# Patient Record
Sex: MALE | Race: WHITE | NOT HISPANIC OR LATINO
[De-identification: names, ages, dates, MRNs, and addresses within clinical notes are randomized per-mention and may not be internally consistent; named-entity substitution may affect disease eponyms.]

---

## 2019-01-02 ENCOUNTER — TELEPHONE (OUTPATIENT)
Dept: SCHEDULING | Age: 54
End: 2019-01-02

## 2019-01-09 ENCOUNTER — WALK IN (OUTPATIENT)
Dept: URGENT CARE | Age: 54
End: 2019-01-09

## 2019-01-09 VITALS
DIASTOLIC BLOOD PRESSURE: 76 MMHG | SYSTOLIC BLOOD PRESSURE: 130 MMHG | WEIGHT: 198 LBS | RESPIRATION RATE: 16 BRPM | TEMPERATURE: 97.8 F | BODY MASS INDEX: 26.82 KG/M2 | HEART RATE: 76 BPM | HEIGHT: 72 IN

## 2019-01-09 DIAGNOSIS — R94.7 NONE TO LOW SERUM CORTISOL RESPONSE WITH ADRENOCORTICOTROPHIC HORMONE (ACTH) STIMULATION TEST: Primary | ICD-10-CM

## 2019-01-09 DIAGNOSIS — Z11.1 SCREENING-PULMONARY TB: ICD-10-CM

## 2019-01-09 DIAGNOSIS — Z02.1 PRE-EMPLOYMENT HEALTH SCREENING EXAMINATION: Primary | ICD-10-CM

## 2019-01-09 PROCEDURE — 86580 TB INTRADERMAL TEST: CPT | Performed by: NURSE PRACTITIONER

## 2019-01-09 SDOH — HEALTH STABILITY: MENTAL HEALTH: HOW OFTEN DO YOU HAVE A DRINK CONTAINING ALCOHOL?: NEVER

## 2019-01-09 ASSESSMENT — ENCOUNTER SYMPTOMS
ALLERGIC/IMMUNOLOGIC NEGATIVE: 1
EYES NEGATIVE: 1
NEUROLOGICAL NEGATIVE: 1
RESPIRATORY NEGATIVE: 1
CONSTITUTIONAL NEGATIVE: 1
GASTROINTESTINAL NEGATIVE: 1
PSYCHIATRIC NEGATIVE: 1
HEMATOLOGIC/LYMPHATIC NEGATIVE: 1

## 2019-01-12 ENCOUNTER — WALK IN (OUTPATIENT)
Dept: URGENT CARE | Age: 54
End: 2019-01-12

## 2019-01-12 DIAGNOSIS — Z11.1 SCREENING FOR TUBERCULOSIS: Primary | ICD-10-CM

## 2019-01-12 PROCEDURE — X1094 NO CHARGE VISIT: HCPCS | Performed by: NURSE PRACTITIONER

## 2021-12-26 ENCOUNTER — APPOINTMENT (OUTPATIENT)
Dept: GENERAL RADIOLOGY | Age: 56
End: 2021-12-26
Attending: EMERGENCY MEDICINE
Payer: COMMERCIAL

## 2021-12-26 ENCOUNTER — HOSPITAL ENCOUNTER (EMERGENCY)
Age: 56
Discharge: HOME | End: 2021-12-26
Attending: EMERGENCY MEDICINE
Payer: COMMERCIAL

## 2021-12-26 VITALS
TEMPERATURE: 98 F | HEART RATE: 67 BPM | WEIGHT: 192 LBS | OXYGEN SATURATION: 97 % | SYSTOLIC BLOOD PRESSURE: 158 MMHG | BODY MASS INDEX: 26.01 KG/M2 | HEIGHT: 72 IN | RESPIRATION RATE: 20 BRPM | DIASTOLIC BLOOD PRESSURE: 95 MMHG

## 2021-12-26 DIAGNOSIS — R07.89 CHEST PAIN, ATYPICAL: Primary | ICD-10-CM

## 2021-12-26 LAB
ALBUMIN SERPL-MCNC: 3.7 G/DL (ref 3.4–5)
ALBUMIN/GLOB SERPL: 1.1 {RATIO} (ref 1–2)
ALP LIVER SERPL-CCNC: 75 U/L
ALT SERPL-CCNC: 38 U/L
ANION GAP SERPL CALC-SCNC: 5 MMOL/L (ref 0–18)
AST SERPL-CCNC: 18 U/L (ref 15–37)
BASOPHILS # BLD AUTO: 0.08 X10(3) UL (ref 0–0.2)
BASOPHILS NFR BLD AUTO: 0.8 %
BILIRUB SERPL-MCNC: 0.4 MG/DL (ref 0.1–2)
BUN BLD-MCNC: 12 MG/DL (ref 7–18)
CALCIUM BLD-MCNC: 8.8 MG/DL (ref 8.5–10.1)
CHLORIDE SERPL-SCNC: 106 MMOL/L (ref 98–112)
CO2 SERPL-SCNC: 27 MMOL/L (ref 21–32)
CREAT BLD-MCNC: 1.12 MG/DL
EOSINOPHIL # BLD AUTO: 0.12 X10(3) UL (ref 0–0.7)
EOSINOPHIL NFR BLD AUTO: 1.2 %
ERYTHROCYTE [DISTWIDTH] IN BLOOD BY AUTOMATED COUNT: 11.9 %
GLOBULIN PLAS-MCNC: 3.4 G/DL (ref 2.8–4.4)
GLUCOSE BLD-MCNC: 140 MG/DL (ref 70–99)
HCT VFR BLD AUTO: 43.3 %
HGB BLD-MCNC: 15.4 G/DL
IMM GRANULOCYTES # BLD AUTO: 0.02 X10(3) UL (ref 0–1)
IMM GRANULOCYTES NFR BLD: 0.2 %
LYMPHOCYTES # BLD AUTO: 1.59 X10(3) UL (ref 1–4)
LYMPHOCYTES NFR BLD AUTO: 16.3 %
MCH RBC QN AUTO: 31.4 PG (ref 26–34)
MCHC RBC AUTO-ENTMCNC: 35.6 G/DL (ref 31–37)
MCV RBC AUTO: 88.4 FL
MONOCYTES # BLD AUTO: 0.74 X10(3) UL (ref 0.1–1)
MONOCYTES NFR BLD AUTO: 7.6 %
NEUTROPHILS # BLD AUTO: 7.18 X10 (3) UL (ref 1.5–7.7)
NEUTROPHILS # BLD AUTO: 7.18 X10(3) UL (ref 1.5–7.7)
NEUTROPHILS NFR BLD AUTO: 73.9 %
OSMOLALITY SERPL CALC.SUM OF ELEC: 288 MOSM/KG (ref 275–295)
PLATELET # BLD AUTO: 197 10(3)UL (ref 150–450)
POTASSIUM SERPL-SCNC: 3.7 MMOL/L (ref 3.5–5.1)
PROT SERPL-MCNC: 7.1 G/DL (ref 6.4–8.2)
RBC # BLD AUTO: 4.9 X10(6)UL
SARS-COV-2 RNA RESP QL NAA+PROBE: NOT DETECTED
SODIUM SERPL-SCNC: 138 MMOL/L (ref 136–145)
TROPONIN I HIGH SENSITIVITY: 5 NG/L
WBC # BLD AUTO: 9.7 X10(3) UL (ref 4–11)

## 2021-12-26 PROCEDURE — 85025 COMPLETE CBC W/AUTO DIFF WBC: CPT | Performed by: EMERGENCY MEDICINE

## 2021-12-26 PROCEDURE — 93010 ELECTROCARDIOGRAM REPORT: CPT

## 2021-12-26 PROCEDURE — 93005 ELECTROCARDIOGRAM TRACING: CPT

## 2021-12-26 PROCEDURE — 83036 HEMOGLOBIN GLYCOSYLATED A1C: CPT | Performed by: EMERGENCY MEDICINE

## 2021-12-26 PROCEDURE — 71045 X-RAY EXAM CHEST 1 VIEW: CPT | Performed by: EMERGENCY MEDICINE

## 2021-12-26 PROCEDURE — 84443 ASSAY THYROID STIM HORMONE: CPT | Performed by: EMERGENCY MEDICINE

## 2021-12-26 PROCEDURE — 80053 COMPREHEN METABOLIC PANEL: CPT | Performed by: EMERGENCY MEDICINE

## 2021-12-26 PROCEDURE — 36415 COLL VENOUS BLD VENIPUNCTURE: CPT

## 2021-12-26 PROCEDURE — 99284 EMERGENCY DEPT VISIT MOD MDM: CPT

## 2021-12-26 PROCEDURE — 84484 ASSAY OF TROPONIN QUANT: CPT | Performed by: EMERGENCY MEDICINE

## 2021-12-27 LAB
EST. AVERAGE GLUCOSE BLD GHB EST-MCNC: 105 MG/DL (ref 68–126)
HBA1C MFR BLD: 5.3 % (ref ?–5.7)
TSI SER-ACNC: 2.86 MIU/ML (ref 0.36–3.74)

## 2021-12-27 NOTE — ED INITIAL ASSESSMENT (HPI)
Pt reports mid chest pain on and off for 6 wks with SOB and radiating pain to lt rib area with increased frequency of pain. Pt states he has slight nausea and tingling to legs on and off.

## 2021-12-27 NOTE — ED PROVIDER NOTES
Patient Seen in: THE [facility] In [place]      History   Patient presents with:  Difficulty Breathing    Stated Complaint: tingling throughout body, chest pressure, batool- intermittent for last month    Subjective:   HPI    November 3rd ha (Temporal)   Resp 20   Ht 182.9 cm (6')   Wt 87.1 kg   SpO2 97%   BMI 26.04 kg/m²         Physical Exam  Vitals and nursing note reviewed. Constitutional:       General: He is not in acute distress. Appearance: He is well-developed.  He is not diaphor normal limits   TROPONIN I HIGH SENSITIVITY - Normal   HEMOGLOBIN A1C - Normal   TSH W REFLEX TO FREE T4 - Normal   RAPID SARS-COV-2 BY PCR - Normal   CBC WITH DIFFERENTIAL WITH PLATELET    Narrative:      The following orders were created for panel order C then a heaviness in his arms and tingling in his hands and sometimes tingling in his feet even. He gets an elevated blood pressure with this during the time. Really his only significant risk factor for heart disease is his age and sex.   His EKG is benign

## 2021-12-28 LAB
ATRIAL RATE: 72 BPM
P AXIS: 55 DEGREES
P-R INTERVAL: 142 MS
Q-T INTERVAL: 410 MS
QRS DURATION: 90 MS
QTC CALCULATION (BEZET): 448 MS
R AXIS: 45 DEGREES
T AXIS: 34 DEGREES
VENTRICULAR RATE: 72 BPM

## 2021-12-29 LAB
ATRIAL RATE: 72 BPM
P AXIS: 56 DEGREES
P-R INTERVAL: 152 MS
Q-T INTERVAL: 388 MS
QRS DURATION: 96 MS
QTC CALCULATION (BEZET): 424 MS
R AXIS: 48 DEGREES
T AXIS: 34 DEGREES
VENTRICULAR RATE: 72 BPM